# Patient Record
Sex: FEMALE | Race: WHITE | HISPANIC OR LATINO | URBAN - METROPOLITAN AREA
[De-identification: names, ages, dates, MRNs, and addresses within clinical notes are randomized per-mention and may not be internally consistent; named-entity substitution may affect disease eponyms.]

---

## 2018-12-14 ENCOUNTER — APPOINTMENT (RX ONLY)
Dept: URBAN - METROPOLITAN AREA CLINIC 15 | Facility: CLINIC | Age: 48
Setting detail: DERMATOLOGY
End: 2018-12-14

## 2018-12-14 DIAGNOSIS — L85.3 XEROSIS CUTIS: ICD-10-CM

## 2018-12-14 DIAGNOSIS — L21.8 OTHER SEBORRHEIC DERMATITIS: ICD-10-CM

## 2018-12-14 DIAGNOSIS — L71.8 OTHER ROSACEA: ICD-10-CM

## 2018-12-14 PROBLEM — M12.9 ARTHROPATHY, UNSPECIFIED: Status: ACTIVE | Noted: 2018-12-14

## 2018-12-14 PROBLEM — F32.9 MAJOR DEPRESSIVE DISORDER, SINGLE EPISODE, UNSPECIFIED: Status: ACTIVE | Noted: 2018-12-14

## 2018-12-14 PROCEDURE — ? COUNSELING

## 2018-12-14 PROCEDURE — ? PRESCRIPTION

## 2018-12-14 PROCEDURE — 99213 OFFICE O/P EST LOW 20 MIN: CPT

## 2018-12-14 PROCEDURE — ? PATIENT SPECIFIC COUNSELING

## 2018-12-14 PROCEDURE — ? TREATMENT REGIMEN

## 2018-12-14 RX ORDER — IVERMECTIN 10 MG/G
CREAM TOPICAL
Qty: 3 | Refills: 4 | Status: ERX | COMMUNITY
Start: 2018-12-14

## 2018-12-14 RX ORDER — DOXYCYCLINE HYCLATE 100 MG/1
CAPSULE, GELATIN COATED ORAL
Qty: 90 | Refills: 3 | Status: ERX | COMMUNITY
Start: 2018-12-14

## 2018-12-14 RX ORDER — DESONIDE 0.5 MG/G
CREAM TOPICAL
Qty: 3 | Refills: 3 | Status: ERX | COMMUNITY
Start: 2018-12-14

## 2018-12-14 RX ORDER — METRONIDAZOLE 7.5 MG/G
CREAM TOPICAL
Qty: 3 | Refills: 5 | Status: ERX | COMMUNITY
Start: 2018-12-14

## 2018-12-14 RX ORDER — SULFACETAMIDE SODIUM, SULFUR 100; 20 MG/G; MG/G
EMULSION TOPICAL
Qty: 1 | Refills: 4 | Status: ERX | COMMUNITY
Start: 2018-12-14

## 2018-12-14 RX ORDER — CLOBETASOL PROPIONATE 0.46 MG/ML
SOLUTION TOPICAL
Qty: 3 | Refills: 3 | Status: ERX | COMMUNITY
Start: 2018-12-14

## 2018-12-14 RX ADMIN — DOXYCYCLINE HYCLATE: 100 CAPSULE, GELATIN COATED ORAL at 20:16

## 2018-12-14 RX ADMIN — DESONIDE: 0.5 CREAM TOPICAL at 20:14

## 2018-12-14 RX ADMIN — SULFACETAMIDE SODIUM, SULFUR: 100; 20 EMULSION TOPICAL at 20:15

## 2018-12-14 RX ADMIN — CLOBETASOL PROPIONATE: 0.46 SOLUTION TOPICAL at 20:17

## 2018-12-14 RX ADMIN — IVERMECTIN: 10 CREAM TOPICAL at 20:15

## 2018-12-14 RX ADMIN — METRONIDAZOLE: 7.5 CREAM TOPICAL at 20:14

## 2018-12-14 ASSESSMENT — LOCATION SIMPLE DESCRIPTION DERM
LOCATION SIMPLE: RIGHT CHEEK
LOCATION SIMPLE: LEFT FOREARM
LOCATION SIMPLE: RIGHT FOREARM
LOCATION SIMPLE: LEFT CHEEK
LOCATION SIMPLE: SCALP

## 2018-12-14 ASSESSMENT — LOCATION ZONE DERM
LOCATION ZONE: SCALP
LOCATION ZONE: FACE
LOCATION ZONE: ARM

## 2018-12-14 ASSESSMENT — LOCATION DETAILED DESCRIPTION DERM
LOCATION DETAILED: RIGHT PROXIMAL DORSAL FOREARM
LOCATION DETAILED: RIGHT INFERIOR CENTRAL MALAR CHEEK
LOCATION DETAILED: LEFT SUPERIOR PARIETAL SCALP
LOCATION DETAILED: LEFT PROXIMAL DORSAL FOREARM
LOCATION DETAILED: LEFT CENTRAL MALAR CHEEK

## 2018-12-14 ASSESSMENT — SEVERITY ASSESSMENT OVERALL AMONG ALL PATIENTS
IN YOUR EXPERIENCE, AMONG ALL PATIENTS YOU HAVE SEEN WITH THIS CONDITION, HOW SEVERE IS THIS PATIENT'S CONDITION?: MILD TO MODERATE

## 2018-12-14 ASSESSMENT — SEVERITY ASSESSMENT: HOW SEVERE IS THIS PATIENT'S CONDITION?: MILD

## 2018-12-14 NOTE — PROCEDURE: TREATMENT REGIMEN
Initiate Treatment: Vola Lupe Cleanser (samples) to wash face twice daily \\nDesonide 0.05% Cream Apply to face in the morning on Monday through Friday for two weeks then three times a week\\nMetronidazole 0.75% Cream Apply to face once at night on Monday, Wednesday and Friday \\nSoolantra Cream (samples) Apply to face at night on Tuesday and Thursday \\nDoxycycline Hyclate 100 mg take one pill daily with food and a full glass of water (start with targadox samples)
Detail Level: Zone
Otc Regimen: CeraVe Cream once daily after showers

## 2018-12-14 NOTE — PROCEDURE: PATIENT SPECIFIC COUNSELING
Detail Level: Zone
Discussed with patient of all possible trigger factors such as stress induced, red wine and spicy food. Will start patient on a treatment plan. Explained to patient that the condition is chronic.
Discussed with patient of all possible trigger factors such as stress induced and weather changes. Explained to patient that the condition is chronic. Will start patient on a topical treatment plan.

## 2019-01-31 ENCOUNTER — APPOINTMENT (RX ONLY)
Dept: URBAN - METROPOLITAN AREA CLINIC 15 | Facility: CLINIC | Age: 49
Setting detail: DERMATOLOGY
End: 2019-01-31

## 2019-01-31 DIAGNOSIS — L30.4 ERYTHEMA INTERTRIGO: ICD-10-CM

## 2019-01-31 DIAGNOSIS — L71.8 OTHER ROSACEA: ICD-10-CM

## 2019-01-31 PROCEDURE — ? COUNSELING

## 2019-01-31 PROCEDURE — ? TREATMENT REGIMEN

## 2019-01-31 PROCEDURE — ? ADDITIONAL NOTES

## 2019-01-31 PROCEDURE — 99213 OFFICE O/P EST LOW 20 MIN: CPT

## 2019-01-31 PROCEDURE — ? PRESCRIPTION

## 2019-01-31 RX ORDER — KETOCONAZOLE 20 MG/G
CREAM TOPICAL
Qty: 1 | Refills: 2 | Status: ERX | COMMUNITY
Start: 2019-01-31

## 2019-01-31 RX ADMIN — KETOCONAZOLE: 20 CREAM TOPICAL at 20:31

## 2019-01-31 ASSESSMENT — SEVERITY ASSESSMENT OVERALL AMONG ALL PATIENTS: IN YOUR EXPERIENCE, AMONG ALL PATIENTS YOU HAVE SEEN WITH THIS CONDITION, HOW SEVERE IS THIS PATIENT'S CONDITION?: MILD

## 2019-01-31 ASSESSMENT — LOCATION DETAILED DESCRIPTION DERM
LOCATION DETAILED: LEFT MEDIAL MALAR CHEEK
LOCATION DETAILED: LEFT ANTERIOR PROXIMAL THIGH
LOCATION DETAILED: RIGHT ANTERIOR MEDIAL PROXIMAL THIGH
LOCATION DETAILED: RIGHT CENTRAL MALAR CHEEK

## 2019-01-31 ASSESSMENT — LOCATION SIMPLE DESCRIPTION DERM
LOCATION SIMPLE: RIGHT THIGH
LOCATION SIMPLE: LEFT THIGH
LOCATION SIMPLE: LEFT CHEEK
LOCATION SIMPLE: RIGHT CHEEK

## 2019-01-31 ASSESSMENT — LOCATION ZONE DERM
LOCATION ZONE: FACE
LOCATION ZONE: LEG

## 2019-01-31 ASSESSMENT — SEVERITY ASSESSMENT: SEVERITY: MILD

## 2019-01-31 NOTE — PROCEDURE: ADDITIONAL NOTES
Additional Notes: Patient advice to dry well irritated area after showering. Discussed with patient the importance of keeping the area dry as much as possible. Will start patient on a treatment plan. Also, recommended to patient to use hair blower to dry off the area.
Detail Level: Zone
Additional Notes: Patient states she has been following treatment as indicated and feels it has improved, however she discontinued doxycycline due to potential side effects. She states that it gave her a yeast infection and does not want to take it anymore. \\n[place] recommended.

## 2019-01-31 NOTE — PROCEDURE: TREATMENT REGIMEN
Detail Level: Zone
Continue Regimen: Gus Menendez Cleanser (samples) to wash face twice daily\\nDesonide 0.05% Cream Apply to face in the morning on Monday through Friday for two weeks then three times a week\\nMetronidazole 0.75% Cream Apply to face once at night on Monday, Wednesday and Friday\\nSoolantra Cream (samples) Apply to face at night on Tuesday and Thursday
Otc Regimen: Zeasorb af Powder to apply daily to prevent the flare